# Patient Record
Sex: FEMALE | Race: WHITE
[De-identification: names, ages, dates, MRNs, and addresses within clinical notes are randomized per-mention and may not be internally consistent; named-entity substitution may affect disease eponyms.]

---

## 2017-12-16 ENCOUNTER — HOSPITAL ENCOUNTER (EMERGENCY)
Dept: HOSPITAL 41 - JD.ED | Age: 37
Discharge: HOME | End: 2017-12-16
Payer: COMMERCIAL
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
## 2017-12-16 DIAGNOSIS — S01.112A: Primary | ICD-10-CM

## 2017-12-16 NOTE — EDM.PDOC
ED HPI GENERAL MEDICAL PROBLEM





- General


Chief Complaint: Laceration


Stated Complaint: laceration to face


Time Seen by Provider: 12/16/17 04:45


Source of Information: Reports: Patient, Family


History Limitations: Reports: No Limitations





- History of Present Illness


INITIAL COMMENTS - FREE TEXT/NARRATIVE: 





This is a 37-year-old  female. She comes to the ER with a large 

laceration following her left eyebrow and is 5 cm in length. The laceration 

goes down to the bone and is gapping. The patient states she is not certain 

what happened but she has been drinking alcohol this evening. One of her 

friends states that she fell down some steps but there was no loss of 

consciousness. She complains of soreness in her hands but denies any other 

injuries. She denies any neck pain back pain chest pain abdominal pain or lower 

extremity pain. She states she is up-to-date with her tetanus.


  ** Left Eye


Pain Score (Numeric/FACES): 4





- Related Data


 Allergies











Allergy/AdvReac Type Severity Reaction Status Date / Time


 


No Known Allergies Allergy   Verified 12/16/17 04:44











Home Meds: 


 Home Meds





. [No Known Home Meds]  12/16/17 [History]











Past Medical History





- Past Health History


Medical/Surgical History: Denies Medical/Surgical History


Respiratory History: Reports: None


Gastrointestinal History: Reports: None


Genitourinary History: Reports: None


OB/GYN History: Reports: None


Musculoskeletal History: Reports: None


Psychiatric History: Reports: None


Endocrine/Metabolic History: Reports: None


Dermatologic History: Reports: None





- Past Surgical History


Female  Surgical History: Reports: None


Musculoskeletal Surgical History: Reports: None





Social & Family History





- Family History


Family Medical History: Noncontributory





- Tobacco Use


Smoking Status *Q: Unknown Ever Smoked


Second Hand Smoke Exposure: No





- Alcohol Use


Days Per Week of Alcohol Use: 0





- Recreational Drug Use


Recreational Drug Use: No





ED ROS GENERAL





- Review of Systems


Review Of Systems: See Below


Constitutional: Denies: Fever, Chills


HEENT: Reports: Other (As per history of present illness)


Respiratory: Reports: No Symptoms


Cardiovascular: Reports: No Symptoms


Endocrine: Reports: No Symptoms


GI/Abdominal: Reports: No Symptoms


: Reports: No Symptoms


Musculoskeletal: Reports: Other (As per history of present illness)


Skin: Reports: Other (As per history of present illness)


Neurological: Reports: No Symptoms, Other


Psychiatric: Reports: No Symptoms


Hematologic/Lymphatic: Reports: No Symptoms





ED EXAM, SKIN/RASH


Exam: See Below


Exam Limited By: No Limitations


General Appearance: Alert, WD/WN, Mild Distress


Eye Exam: Bilateral Eye: EOMI, Normal Inspection


Ears: Normal External Exam


Nose: Normal Inspection


Throat/Mouth: Normal Inspection, Normal Lips, Normal Voice


Head: Other (She is noted to have a 5 cm laceration following the left eyebrow 

down to the skull but not through the galea it is gapping open)


Neck: Supple, Other (She denies any neck tenderness)


Respiratory/Chest: No Respiratory Distress, Chest Non-Tender


Back Exam: Full Range of Motion, Other (She denies any thoracic or lumbar 

tenderness)


Extremities: Normal Inspection, Normal Range of Motion, Other (Examination of 

both her wrist and her hands do not reveal any bruising or swelling she has 

full range of motion of her wrist so they're sore for range of motion of her 

hands and she is able to make a fist and extend completely and sore but not 

painful, there is no bone tenderness on palpation of her wrist or her hands)


Neurological: Alert, Oriented, No Motor/Sensory Deficits


Psychiatric: Anxious, Tearful


Skin: Warm, Dry





ED SKIN PROCEDURES





- Laceration/Wound Repair


  ** Left Forehead


Lac/Wound length In cm: 5


Appearance: Subcutaneous, Linear, Clean


Distal NVT: Neuro & Vascular Intact


Anesthetic Type: Local


Local Anesthesia - Lidocaine (Xylocaine): 1% Plain


Local Anesthetic Volume: Other (8 cc)


Skin Prep: Providone-Iodine (Betadine), Saline, Sterile Drape


Exploration/Debridement/Repair: Wound Explored


Closed with: Sutures


Suture Size: other (6-0)


# of Sutures: 10


Suture Type: Nylon, Running


Suture Size: other (5-0)


# of Sutures: 6


Repaired with: Vicryl


Drain Placement: No


Sterile Dressing Applied: Nurse


Tetanus Status Addressed: Yes


Complications: No


Progress/Comments: 





Once the subcutaneous sutures were in it pulled the wound together very nicely 

and brought the forehead tissue down against the skull or galea so there was no 

gapping space and then the skin sutures were placed. The patient tolerated the 

procedure well





Course





- Vital Signs


Last Recorded V/S: 





 Last Vital Signs











Temp  98.5 F   12/16/17 04:42


 


Pulse  88   12/16/17 04:42


 


Resp  18   12/16/17 04:42


 


BP  135/88   12/16/17 04:42


 


Pulse Ox  100   12/16/17 04:42














- Orders/Labs/Meds


Meds: 





Medications














Discontinued Medications














Generic Name Dose Route Start Last Admin





  Trade Name Drew  PRN Reason Stop Dose Admin


 


Lidocaine HCl  50 ml  12/16/17 04:48  12/16/17 04:51





  Xylocaine 1%  INJECT  12/16/17 04:49  50 ml





  NOW STA   Administration














Departure





- Departure


Time of Disposition: 05:33


Disposition: Home, Self-Care 01


Condition: Good


Clinical Impression: 


Laceration of left eyebrow without complication


Qualifiers:


 Encounter type: initial encounter Qualified Code(s): S01.112A - Laceration 

without foreign body of left eyelid and periocular area, initial encounter








- Discharge Information


Instructions:  Laceration Care, Adult, Easy-to-Read


Referrals: 


PCP,None [Primary Care Provider] - 


Additional Instructions: 


Keep your wound clean and dry and covered for at least 3 days, when you change 

the dressing use water to cleanse the wound carefully, take Tylenol or 

ibuprofen or Aleve as needed for the soreness or if you have a headache, you 

need to follow up with the ER, your family physician, or the walk-in clinic in 5

-7 days for suture removal, watch for signs of infection such as increased 

redness increased swelling or yellow purulent drainage and then follow-up the 

ER immediately if this occurs, otherwise follow-up the ER as needed

## 2018-02-18 ENCOUNTER — HOSPITAL ENCOUNTER (EMERGENCY)
Dept: HOSPITAL 41 - JD.ED | Age: 38
Discharge: HOME | End: 2018-02-18
Payer: COMMERCIAL

## 2018-02-18 DIAGNOSIS — S82.831A: Primary | ICD-10-CM
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
## 2018-02-18 DIAGNOSIS — F17.210: ICD-10-CM

## 2018-02-18 NOTE — EDM.PDOC
ED HPI GENERAL MEDICAL PROBLEM





- General


Chief Complaint: Lower Extremity Injury/Pain


Stated Complaint: RT ANKLE INJURY


Time Seen by Provider: 02/18/18 12:17


Source of Information: Reports: Patient


History Limitations: Reports: No Limitations





- History of Present Illness


INITIAL COMMENTS - FREE TEXT/NARRATIVE: 





The patient presents with right ankle pain.  She took her dog out last night 

and she may have stepped wrong or something and she inverted her ankle and 

fell.  She did not hit her head or hurt her neck.  She has no other complaints.

  She has right ankle pain with edema and ecchymosis.


Onset: Sudden


Duration: Day(s): (Last night)


Location: Reports: Lower Extremity, Right (ankle)


Quality: Reports: Sharp


Severity: Moderate


Improves with: Reports: Immobilization


Worsens with: Reports: Movement


Context: Reports: Trauma (She inverted her ankle and fell last night)


Associated Symptoms: Reports: No Other Symptoms


  ** Right Ankle


Pain Score (Numeric/FACES): 10





- Related Data


 Allergies











Allergy/AdvReac Type Severity Reaction Status Date / Time


 


No Known Allergies Allergy   Verified 12/16/17 04:44











Home Meds: 


 Home Meds





. [No Known Home Meds]  12/16/17 [History]











Past Medical History





- Past Health History


Medical/Surgical History: Denies Medical/Surgical History


Respiratory History: Reports: None


Gastrointestinal History: Reports: None


Genitourinary History: Reports: None


OB/GYN History: Reports: None


Musculoskeletal History: Reports: None


Psychiatric History: Reports: None


Endocrine/Metabolic History: Reports: None


Dermatologic History: Reports: None





- Past Surgical History


Female  Surgical History: Reports: None


Musculoskeletal Surgical History: Reports: None





Social & Family History





- Family History


Family Medical History: Noncontributory





- Tobacco Use


Smoking Status *Q: Current Every Day Smoker


Years of Tobacco use: 14


Packs/Tins Daily: 0.5


Second Hand Smoke Exposure: No





- Caffeine Use


Caffeine Use: Reports: Coffee, Tea





- Alcohol Use


Days Per Week of Alcohol Use: 0





- Recreational Drug Use


Recreational Drug Use: No





Review of Systems





- Review of Systems


Review Of Systems: See Below


Constitutional: Reports: No Symptoms


Eyes: Reports: No Symptoms


Ears: Reports: No Symptoms


Nose: Reports: No Symptoms


Mouth/Throat: Reports: No Symptoms


Respiratory: Reports: No Symptoms


Cardiovascular: Reports: No Symptoms


GI/Abdominal: Reports: No Symptoms


Genitourinary: Reports: No Symptoms


Musculoskeletal: Reports: Other (Right ankle pain, edema and ecchymosis)





ED EXAM, GENERAL





- Physical Exam


Exam: See Below


Exam Limited By: No Limitations


General Appearance: Alert, No Apparent Distress


Ears: Normal External Exam


Nose: Normal Inspection


Head: Atraumatic, Normocephalic


Neck: Normal Inspection


Respiratory/Chest: No Respiratory Distress


Extremities: Other (Edema, ecchymosis and pain upon palpation to the lateral 

malleolus.  Good sensation and pulses distally.  Pain upon palpation to the 

fibular head.)





Course





- Orders/Labs/Meds


Orders: 


 Active Orders 24 hr











 Category Date Time Status


 


 Ankle Min 3V Rt [CR] Stat Exams  02/18/18 12:35 Taken


 


 Tibia Fibula Rt [CR] Stat Exams  02/18/18 12:35 Taken














- Re-Assessments/Exams


Free Text/Narrative Re-Assessment/Exam: 





02/18/18 12:43


I have ordered an x-ray of her ankle and tib fib.


02/18/18 13:28


The patient has a distal fibular fracture.  I will get her in a walking boot 

and crutches and have her follow up with Dr Marks.





Departure





- Departure


Time of Disposition: 13:35


Disposition: Home, Self-Care 01


Condition: Good


Clinical Impression: 


Fracture of distal fibula


Qualifiers:


 Encounter type: initial encounter Fracture type: closed Fracture morphology: 

unspecified fracture morphology Laterality: right Qualified Code(s): S82.831A - 

Other fracture of upper and lower end of right fibula, initial encounter for 

closed fracture








- Discharge Information


Referrals: 


PCP,None [Primary Care Provider] - 


Jose M Marks DO [Physician] - 1 Week


Forms:  ED Department Discharge


Additional Instructions: 


Ice your ankle for 15 minutes every other hour while awake for 2 days.  Elevate 

your ankle as much as you can for 2 days.  Take tylenol or motrin for pain.  

Wear the walking boot for support and do not put any weight on your leg for 

now.  Follow up with Dr Marks within 1 week.  Please return if you are worse.





- My Orders


Last 24 Hours: 


My Active Orders





02/18/18 12:35


Ankle Min 3V Rt [CR] Stat 


Tibia Fibula Rt [CR] Stat 














- Assessment/Plan


Last 24 Hours: 


My Active Orders





02/18/18 12:35


Ankle Min 3V Rt [CR] Stat 


Tibia Fibula Rt [CR] Stat

## 2018-02-19 NOTE — CR
Right ankle: Four views of the right ankle were obtained.

 

Minimally comminuted distal fibular fracture is seen through the 

lateral malleolus.  Minimal displacement is seen up to 1.5 mm.  No 

additional fracture is seen.  Soft tissue swelling is identified.

 

Impression:

1.  Minimally displaced lateral malleolar fracture with soft tissue 

swelling.

 

Diagnostic code #3

## 2018-02-19 NOTE — CR
Right tibia and fibula:  Two views of the right tibia and fibula were 

obtained.

 

Distal fibular fracture is again seen with soft tissue swelling at the

 ankle.  No proximal abnormality is seen.

 

Impression:

1.  Distal fibular fracture with soft tissue swelling.

2.  Right tibia and fibula study are otherwise unremarkable.

 

Diagnostic code #3

## 2018-04-26 ENCOUNTER — HOSPITAL ENCOUNTER (EMERGENCY)
Dept: HOSPITAL 41 - JD.ED | Age: 38
Discharge: HOME | End: 2018-04-26
Payer: COMMERCIAL

## 2018-04-26 DIAGNOSIS — M54.2: Primary | ICD-10-CM

## 2018-04-26 DIAGNOSIS — F17.210: ICD-10-CM

## 2018-04-26 NOTE — EDM.PDOC
ED HPI GENERAL MEDICAL PROBLEM





- General


Chief Complaint: Upper Extremity Injury/Pain


Stated Complaint: LT SHOULDER PAIN


Time Seen by Provider: 04/26/18 07:12


Source of Information: Reports: Patient


History Limitations: Reports: No Limitations





- History of Present Illness


INITIAL COMMENTS - FREE TEXT/NARRATIVE: 





37-year-old female presents to the ED with diffuse cervical neck pain radiating 

into across her left posterior superior shoulder and into her upper arm. She 

relates the pain is gradually become more intense and constant over the last 10-

12 days. She recognizes no acute injuries to her neck. But she relates that she 

did fall face first with resultant laceration above her left eyebrow requiring 

suture repair about 3 months ago. Apparently she did not have any cervical pain 

at that time. She's been to the chiropractor massage therapist with minimal 

improvement. She's been taking Aleve primarily for pain relief into high-dose 

like 4 to time. No similar previous prongs. No known injuries to her shoulder. 

Pain is constant. She cannot lie on that side to sleep. She feels better if she 

puts her chin on her chest it relieves the pain. Pain is worsened by 

hyperextending her cervical spine.


Onset: Gradual


Onset Date: 04/13/18


Duration: Day(s): (On or about that date.)


Location: Reports: Neck (Radiating to her left upper extremity.), Radiates to (

Left upper extremity primarily the shoulder and arm.)


Quality: Reports: Ache, Throbbing, Other


Severity: Severe (Burning lancinating pain with certain movements.)


Improves with: Reports: Other (She's better for has less pain and she has chin 

on chest position.)


Worsens with: Reports: Movement (Particularily hyper extension of the neck.)


Context: Denies: Activity, Exercise, Lifting, Sick Contact, Trauma, Other


Associated Symptoms: Reports: No Other Symptoms, Other (Markedly disrupted 

sleep due to pain.).  Denies: Diaphoresis, Fever/Chills, Headaches, Loss of 

Appetite, Nausea/Vomiting, Rash, Seizure, Shortness of Breath


Treatments PTA: Reports: NSAIDS


  ** Left Shoulder


Pain Score (Numeric/FACES): 8





- Related Data


 Allergies











Allergy/AdvReac Type Severity Reaction Status Date / Time


 


No Known Allergies Allergy   Verified 04/26/18 07:10











Home Meds: 


 Home Meds





Diclofenac Sodium [Voltaren] 50 mg PO TID #36 tab.ec 04/26/18 [Rx]


oxyCODONE HCl/Acetaminophen [Percocet 5-325 mg Tablet] 1 - 2 each PO Q4H PRN #

30 tablet 04/26/18 [Rx]


predniSONE [Deltasone] 20 mg PO ASDIRECTED #18 tablet 04/26/18 [Rx]











Past Medical History





- Past Health History


Medical/Surgical History: Denies Medical/Surgical History


Respiratory History: Reports: None


Gastrointestinal History: Reports: None


Genitourinary History: Reports: None


OB/GYN History: Reports: None


Musculoskeletal History: Reports: None


Psychiatric History: Reports: None


Endocrine/Metabolic History: Reports: None


Dermatologic History: Reports: None





- Past Surgical History


Female  Surgical History: Reports: None


Musculoskeletal Surgical History: Reports: None





Social & Family History





- Family History


Family Medical History: Noncontributory





- Tobacco Use


Smoking Status *Q: Current Every Day Smoker


Years of Tobacco use: 19


Packs/Tins Daily: 0.5


Used Tobacco, but Quit: No


Second Hand Smoke Exposure: No





- Caffeine Use


Caffeine Use: Reports: Coffee





- Alcohol Use


Days Per Week of Alcohol Use: 0





- Recreational Drug Use


Recreational Drug Use: No





- Living Situation & Occupation


Living situation: Reports: Single


Occupation: Employed





Review of Systems





- Review of Systems


Review Of Systems: See Below


Constitutional: Reports: No Symptoms


Eyes: Reports: No Symptoms


Ears: Reports: No Symptoms


Nose: Reports: No Symptoms


Mouth/Throat: Reports: No Symptoms


Respiratory: Reports: No Symptoms


Cardiovascular: Reports: No Symptoms


GI/Abdominal: Reports: No Symptoms


Genitourinary: Reports: No Symptoms


Musculoskeletal: Reports: Neck Pain, Shoulder Pain


Skin: Reports: No Symptoms (See history of present illness)


Neurological: Reports: No Symptoms


Psychiatric: Reports: No Symptoms





ED EXAM, GENERAL





- Physical Exam


Exam: See Below


Exam Limited By: No Limitations


General Appearance: Alert, WD/WN, Moderate Distress


Eye Exam: Bilateral Eye: Normal Inspection (Appears to be in genuine pain.)


Head: Atraumatic, Normocephalic


Neck: Normal Inspection, Tender Lateral (Left side particularly C5-C6 facet 

joint area.), Other (Axial traction to the neck increased radicular pain into 

the left upper extremity.).  No: Lymphadenopathy (L), Lymphadenopathy (R)


Respiratory/Chest: No Respiratory Distress, Lungs Clear, Normal Breath Sounds, 

No Accessory Muscle Use


Cardiovascular: Normal Peripheral Pulses, Regular Rate, Rhythm, No Edema, No 

Gallop, No Murmur


Extremities: Normal Inspection, Normal Range of Motion, Non-Tender, No Pedal 

Edema, Other (No evidence of rotator cuff injury no evidence of biceps 

tendinitis or deltoid tendinitis. There is pain across the superior aspect of 

the left shoulder in the distribution of the superior belly of the trapezius 

and levator scapula muscle distribution. There is spasm of this muscle.)


Neurological: Alert, Oriented, CN II-XII Intact, Normal Cognition, Normal Gait, 

Normal Reflexes, No Motor/Sensory Deficits


Psychiatric: Normal Affect, Normal Mood


Skin Exam: Warm, Dry, Intact, Normal Color, No Rash





Course





- Vital Signs


Last Recorded V/S: 


 Last Vital Signs











Temp  36.6 C   04/26/18 07:07


 


Pulse  69   04/26/18 07:07


 


Resp  18   04/26/18 07:07


 


BP  122/81   04/26/18 07:07


 


Pulse Ox  100   04/26/18 07:07














- Radiology Interpretation


Free Text/Narrative:: 


37-year-old female presents to the ED with gradually worsening cervical neck 

pain rating to the left upper shoulder and upper arm. She estimates it's been 

getting gradually worsening over a period of 10-14 days. [place] injury 

occurred. She works as a  and is on all different kinds of positions. 

Fall 3 months ago with a resultant laceration above her left eyebrow that 

required laceration repair. Therefore it suggest there probably was a hyper 

extension injury to her cervical spine at that time. However she didn't develop 

any symptoms until about 10-12 days ago. She's been to the chiropractor and 

massage therapist with limited improvement in the pain. Pain is constant and 

only flexing her chin on her chest gives her some relief of pain. Examination 

suggests that she has a cervical disc herniation likely at C5-6 C7. Plan will 

be to have arrange an MRI as an outpatient and have her follow-up in the clinic 

for physiotherapy arrangements and/or neurosurgical consultation. In the 

meantime I will place her on Voltaren 50 mg 3 times daily for the next 12 days. 

Prednisone 20 mg a.m. and p.m. for 6 days and then once in the morning only for 

another 6 days. Percocet tabs 5/3/25 one or 2 every 4-6 hours needed for pain 

relief primarily at bedtime so that she can sleep. 30 tablets were provided.








Departure





- Departure


Time of Disposition: 07:25


Disposition: Home, Self-Care 01


Condition: Fair


Clinical Impression: 


 Cervical neck pain with evidence of disc disease








- Discharge Information


Prescriptions: 


Diclofenac Sodium [Voltaren] 50 mg PO TID #36 tab.ec


oxyCODONE HCl/Acetaminophen [Percocet 5-325 mg Tablet] 1 - 2 each PO Q4H PRN #

30 tablet


 PRN Reason: pain relief. 


predniSONE [Deltasone] 20 mg PO ASDIRECTED #18 tablet


Referrals: 


PCP,None [Primary Care Provider] - 


Forms:  ED Department Discharge


Additional Instructions: 


Evaluation in the emergency room today in regards to pain starting in her neck 

and radiating into the left shoulder and upper arm. This started over 10 days 

ago and is gradually worsening. Clinical examination suggests likely disc 

herniation or bulge at the C5-C6 level. Clinically there was no evidence of any 

rotator cuff tears within the shoulder itself. Suggest treatment to be anti-

inflammatory Voltaren 50 mg 3 times daily for the next 12 days. Deltasone 20 mg 

with breakfast and supper for 6 days then 1 tab in the morning only for another 

6 days to further reduce pain and inflammation from the nerve being compressed. 

Pain medication is Percocet 5/3/25 milligrams strength one or 2 every 4-6 hours 

as necessary for pain relief. MRI of her cervical spine needs to be done. I 

will have the x-ray department call you later this morning with an appointment 

time that is mutually agreeable to both of you. You will need follow-up with a 

local doctor to further facilitate either physiotherapy program or referral to 

neurosurgery. Suggest make an appointment to see Dr. Bond on the other side of 

the Hospital second floor. Please call [phone] to arrange an appointment. 

Results of your MRI will be sent to her office. The results are usually 

available a day after your MRI is been completed.

## 2019-04-09 ENCOUNTER — HOSPITAL ENCOUNTER (INPATIENT)
Dept: HOSPITAL 41 - JD.OB | Age: 39
LOS: 2 days | Discharge: HOME | End: 2019-04-11
Attending: OBSTETRICS & GYNECOLOGY | Admitting: OBSTETRICS & GYNECOLOGY
Payer: COMMERCIAL

## 2019-04-09 DIAGNOSIS — Z88.0: ICD-10-CM

## 2019-04-09 DIAGNOSIS — O36.8130: Primary | ICD-10-CM

## 2019-04-09 DIAGNOSIS — Z3A.39: ICD-10-CM

## 2019-04-09 DIAGNOSIS — F17.210: ICD-10-CM

## 2019-04-09 NOTE — PCM.PREANE
Preanesthetic Assessment





- Anesthesia/Transfusion/Family Hx


Anesthesia History: Prior Anesthesia Without Reaction


Family History of Anesthesia Reaction: No


Transfusion History: No Prior Transfusion(s)





- Review of Systems


General: No Symptoms


Pulmonary: No Symptoms


Cardiovascular: No Symptoms


Gastrointestinal: Other (Heart Burn )


Neurological: No Symptoms


Other: Reports: None





- Physical Assessment


O2 Sat by Pulse Oximetry: 98


Respiratory Rate: 18


Vital Signs: 





 Last Vital Signs











Temp  36.4 C   04/09/19 13:39


 


Pulse  101 H  04/09/19 13:39


 


Resp  18   04/09/19 13:39


 


BP  127/94 H  04/09/19 13:39


 


Pulse Ox      











Height: 1.68 m


Weight: 94.347 kg


ASA Class: 2


Mental Status: Alert & Oriented x3


Airway Class: Mallampati = 1


Dentition: Reports: Normal Dentition


Thyro-Mental Finger Breadths: 3


Mouth Opening Finger Breadths: 3


ROM/Head Extension: Full


Lungs: Clear to Auscultation, Normal Respiratory Effort


Cardiovascular: Regular Rate, Regular Rhythm





- Lab


Values: 





 Laboratory Last Values











WBC  13.50 K/mm3 (3.98-10.04)  H  04/09/19  14:25    


 


RBC  4.22 M/mm3 (3.98-5.22)   04/09/19  14:25    


 


Hgb  13.7 gm/L (11.2-15.7)   04/09/19  14:25    


 


Hct  39.7 % (34.1-44.9)   04/09/19  14:25    


 


MCV  94.1 fl (79.4-94.8)   04/09/19  14:25    


 


MCH  32.5 pg (25.6-32.2)  H  04/09/19  14:25    


 


MCHC  34.5 g/dl (32.2-35.5)   04/09/19  14:25    


 


RDW Std Deviation  45.4 fL (36.4-46.3)   04/09/19  14:25    


 


Plt Count  167 K/mm3 (182-369)  L  04/09/19  14:25    


 


MPV  11.3 fl (9.4-12.3)   04/09/19  14:25    


 


Neut % (Auto)  67.4 % (34.0-71.1)   04/09/19  14:25    


 


Lymph % (Auto)  24.4 % (19.3-51.7)   04/09/19  14:25    


 


Mono % (Auto)  7.1 % (4.7-12.5)   04/09/19  14:25    


 


Eos % (Auto)  0.4  (0.7-5.8)  L  04/09/19  14:25    


 


Baso % (Auto)  0.1 % (0.1-1.2)   04/09/19  14:25    


 


Neut # (Auto)  9.09 K/mm3 (1.56-6.13)  H  04/09/19  14:25    


 


Lymph # (Auto)  3.29 K/mm3 (1.18-3.74)   04/09/19  14:25    


 


Mono # (Auto)  0.96 K/mm3 (0.24-0.36)  H  04/09/19  14:25    


 


Eos # (Auto)  0.06 K/mm3 (0.04-0.36)   04/09/19  14:25    


 


Baso # (Auto)  0.02 K/mm3 (0.01-0.08)   04/09/19  14:25    


 


RPR  Non-reactive  (NONREACTIVE)   04/09/19  14:25    














- Allergies


Allergies/Adverse Reactions: 


 Allergies











Allergy/AdvReac Type Severity Reaction Status Date / Time


 


No Known Allergies Allergy   Verified 04/26/18 07:10














- Acknowledgements


Anesthesia Type Planned: Epidural


Pt an Appropriate Candidate for the Planned Anesthesia: Yes


Alternatives and Risks of Anesthesia Discussed w Pt/Guardian: Yes


Pt/Guardian Understands and Agrees with Anesthesia Plan: Yes





PreAnesthesia Questionnaire





- Past Health History


Medical/Surgical History: Denies Medical/Surgical History


HEENT History: Reports: Impaired Vision


Other HEENT History: wears corrective lenses


Respiratory History: Reports: None


Gastrointestinal History: Reports: None


Genitourinary History: Reports: None


OB/GYN History: Reports: Pregnancy


Musculoskeletal History: Reports: None


Other Musculoskeletal History: ankle and wrsit fractures


Psychiatric History: Reports: None


Endocrine/Metabolic History: Reports: None


Dermatologic History: Reports: None





- Past Surgical History


Female  Surgical History: Reports: None


Musculoskeletal Surgical History: Reports: None





- SUBSTANCE USE


Smoking Status *Q: Current Some Day Smoker


Tobacco Use Within Last Twelve Months: Cigarettes


Recreational Drug Use History: No





- HOME MEDS


Home Medications: 


 Home Meds





PNV95/Ferrous Fumarate/FA [Prenatal Tablet] 1 tab PO DAILY 04/09/19 [History]











- CURRENT (IN HOUSE) MEDS


Current Meds: 





 Current Medications





Diphenhydramine HCl (Benadryl)  25 mg IVPUSH Q6H PRN


   PRN Reason: Pruritis


Ephedrine Sulfate (Ephedrine Sulfate)  5 mg IVPUSH ASDIRECTED PRN


   PRN Reason: Hypotension


Fentanyl (Sublimaze)  100 mcg EPIDUR ONETIME PRN


   PRN Reason: Pain


   Last Admin: 04/09/19 21:34 Dose:  100 mcg


Ampicillin Sodium 1 gm/ Sodium (Chloride)  100 mls @ 200 mls/hr IV Q4H STONEY


   Last Admin: 04/09/19 18:23 Dose:  200 mls/hr


Lactated Ringer's (Ringers, Lactated)  1,000 mls @ 100 mls/hr IV ASDIRECTED STONEY


   Last Admin: 04/09/19 19:57 Dose:  100 mls/hr


Oxytocin/Lactated Ringer's (Pitocin In Lr 10 Units/1,000 Ml)  10 unit in 1,000 

mls @ 12 mls/hr IV TITRATE STONEY; Protocol


   Last Titration: 04/09/19 19:54 Dose:  14 munits/min, 84 mls/hr


Oxytocin/Lactated Ringer's (Pitocin In Lr 10 Units/1,000 Ml)  10 unit in 1,000 

mls @ 500 mls/hr IV .CONTINUOUS STONEY


Nalbuphine HCl (Nubain)  10 mg IVPUSH Q2H PRN


   PRN Reason: pain


Ondansetron HCl (Zofran)  4 mg IVPUSH Q4H PRN


   PRN Reason: Nausea/Vomiting


Ondansetron HCl (Zofran)  4 mg IVPUSH ONETIME PRN


   PRN Reason: Nausea/Vomiting


Sodium Chloride (Saline Flush)  10 ml FLUSH ASDIRECTED PRN


   PRN Reason: Keep Vein Open





Discontinued Medications





Fentanyl/Bupivacaine HCl (Fentanyl-Bupiv-Ns 2 Mcg/Ml-0.125%)  100 ml EPIDUR 

ONETIME ONE


   Stop: 04/09/19 20:53


   Last Admin: 04/09/19 21:34 Dose:  100 ml


Ampicillin Sodium 2 gm/ Sodium (Chloride)  100 mls @ 200 mls/hr IV ONETIME ONE


   Stop: 04/09/19 14:29


   Last Admin: 04/09/19 14:35 Dose:  200 mls/hr

## 2019-04-10 PROCEDURE — 3E0R3BZ INTRODUCTION OF ANESTHETIC AGENT INTO SPINAL CANAL, PERCUTANEOUS APPROACH: ICD-10-PCS

## 2019-04-10 PROCEDURE — 00HU33Z INSERTION OF INFUSION DEVICE INTO SPINAL CANAL, PERCUTANEOUS APPROACH: ICD-10-PCS

## 2019-04-10 PROCEDURE — 6A550ZT PHERESIS OF CORD BLOOD STEM CELLS, SINGLE: ICD-10-PCS | Performed by: OBSTETRICS & GYNECOLOGY

## 2019-04-10 PROCEDURE — 3E033VJ INTRODUCTION OF OTHER HORMONE INTO PERIPHERAL VEIN, PERCUTANEOUS APPROACH: ICD-10-PCS | Performed by: OBSTETRICS & GYNECOLOGY

## 2019-04-10 PROCEDURE — 0UQGXZZ REPAIR VAGINA, EXTERNAL APPROACH: ICD-10-PCS | Performed by: OBSTETRICS & GYNECOLOGY

## 2019-04-10 PROCEDURE — 10907ZC DRAINAGE OF AMNIOTIC FLUID, THERAPEUTIC FROM PRODUCTS OF CONCEPTION, VIA NATURAL OR ARTIFICIAL OPENING: ICD-10-PCS | Performed by: OBSTETRICS & GYNECOLOGY

## 2019-04-10 NOTE — PCM.LDHP
L&D History of Present Illness





- General


Date of Service: 04/10/19


Admit Problem/Dx: 


 Patient Status Order with Admit Dx/Problem





19 13:39


Patient Status [ADT] Routine 








 Admission Diagnosis/Problem











Admission Diagnosis/Problem    Pregnancy














04/10/19 19:39


Rosalba is a 38-year-old  2 para 0010 white female was admitted for 

induction of labor for reported decreased fetal movement at 39-6/7 weeks with 

an TIESHA of 4/10/2019.


Source of Information: Patient


History Limitations: Reports: No Limitations





- History of Present Illness


Introduction:: 





Rosalba is a 38-year-old  2 para 0010 white female at 39-6/7 weeks with 

an TIESHA of 4/10/2019 is admitted at midday on 2019 for reported decreased 

fetal activity.


The patient had a reactive NST patient is not feeling the baby move adequately 

and decision was made to proceed with induction of labor for this reason. 





OB/GYN history  2 para 0010. [place]  is based on early ultrasound 

done on 920 17-4/7 weeks gestational age. LMP was 2018. Patient had 2 other 

ultrasounds on 2018 and 2018 both supported and TIESHA of 4/10/2019. 

Prenatal course was relatively unremarkable. Her first prenatal visit occurred 

on 2018. She had been seen on a regular basis. She is a centering 

pregnancy patient. Fundal height growth was appropriate area weight gain was 

from 178-207.6 pounds or approximately 29.6 pound weight gain.





Previous obstetric history includes miscarriage at 12 weeks and 2008. 

Cook testing was negative. Her [place] postpartum depression screening 

score was 5 on a scale of 30 on 11/15/2018. She plans to breast-feed.





Prenatal laboratory testing shows blood to be B+. Initial first prenatal labs 

show hemoglobin of 13. platelets 280,000. She is rubella immune. RPR is 

nonreactive. Urine culture was negative. H[employer] B surface antigen and HIV 

assays were both negative. Chlamydia and gonorrhea assays were both negative. 

Second trimester laboratory testing showed a hemoglobin 11.9 g/dL. Her 

platelets are 270,000. One-hour GTT was normal at 115 mg/dL. Her group B strep 

screen was positive. Patient allergic to penicillin and therefore it is a 

candidate for penicillin prophylaxis for group B strep while in labor and 

delivery.





Allergies: None





Medications:





1. Ferrous sulfate 325 mg by mouth daily





2. Prenatal vitamins daily





3. Cleocin T external solution 1%twice a day for acne





Past medical history:





1. Miscarriage 1





Past surgical history:





1. LEEP done 2012cervical dysplasia





2. [place] teeth extraction.





Family history mother is alive and well. Father is alive with hypertension. 

Maternal grandfather is deceasedcause unknown. Maternal grandmother 

deceasedcause unknown. General grandfather is deceasedunknown cause. Paternal 

grandmother is  due to internal bleeding, possible cancer. No family 

history of cancer otherwise. No bleeding, blood clotting, anesthesia or 

pregnancy-related problems noted with the exception of a cousin with cervical 

cancer.





Social history. Patient's currently smokes 1/2-1/3 pack cigarettes per day. She 

does not use any significant loss of alcohol or drugs. She lives in [facility]. She is single. Her significant other is Cornelius.





Review of systems: In general patient has no complaints. Patient's pregnancy 

symptoms. Baby has been active.





Skin: Negative





Lungs: No infectious symptoms or shortness of breath





Cardiovascular: No chest pain or exercise intolerance





Breasts: No lumps, changes in size, pain, dimpling, discharge or axillary or 

supraclavicular concerns.





GI: Negative





: Negative





Musculoskeletal: Negative





Neurological: Negative





In general the patient is well-developed, well-nourished, pleasant female of 

stated age in no acute distress.





Skin is warm dry without lesions.





HEENT, neck and back within normal limits.





Lungs are clear with good breath sounds in all lung fields.





Breast exam deferred having been done at time of first prenatal visit and found 

to be normal. Patient plans to breast-feed.





Cardiovascular exam shows regular and rhythm without murmurs.





Abdomen is flat, soft, nontender without masses or organomegaly. Positive bowel 

sounds are noted. No inguinal lymphadenopathy or hernias are noted.





Genital exam shows cervix to be 3 cm/90% effaced/anterior position/-1 station/

soft.





Extremities and neurological exam are grossly within normal limits.  


Pain Score: 10





- Related Data


Allergies/Adverse Reactions: 


 Allergies











Allergy/AdvReac Type Severity Reaction Status Date / Time


 


No Known Allergies Allergy   Verified 18 07:10











Home Medications: 


 Home Meds





PNV95/Ferrous Fumarate/FA [Prenatal Tablet] 1 tab PO DAILY 19 [History]











Past Medical History





- Past Health History


Medical/Surgical History: Denies Medical/Surgical History


HEENT History: Reports: Impaired Vision


Other HEENT History: wears corrective lenses


Respiratory History: Reports: None


Gastrointestinal History: Reports: None


Genitourinary History: Reports: None


OB/GYN History: Reports: Pregnancy


Musculoskeletal History: Reports: None


Other Musculoskeletal History: ankle and wrsit fractures


Psychiatric History: Reports: None


Endocrine/Metabolic History: Reports: None


Dermatologic History: Reports: None





- Past Surgical History


Female  Surgical History: Reports: None


Musculoskeletal Surgical History: Reports: None





Social & Family History





- Family History


Family Medical History: Noncontributory





- Tobacco Use


Smoking Status *Q: Current Some Day Smoker


Years of Tobacco use: 20


Packs/Tins Daily: 0.2


Used Tobacco, but Quit: Yes


Month/Year Tobacco Last Used: april





- Caffeine Use


Caffeine Use: Reports: Coffee, Soda





- Recreational Drug Use


Recreational Drug Use: No





- Living Situation & Occupation


Living situation: Reports: Single


Occupation: Employed





H&P Review of Systems





- Review of Systems:


Review Of Systems: See Below





L&D Exam





- Exam


Exam: See Below





- Vital Signs


Vital Signs: 


 Last Vital Signs











Temp  36.4 C   19 13:39


 


Pulse  101 H  19 13:39


 


Resp  18   19 21:36


 


BP  127/94 H  19 13:39


 


Pulse Ox  98   19 21:36











Weight: 94.347 kg





- Patient Data


Lab Results Last 24 hrs: 


 Laboratory Results - last 24 hr











  19 Range/Units





  14:25 14:25 


 


WBC   13.50 H  (3.98-10.04)  K/mm3


 


RBC   4.22  (3.98-5.22)  M/mm3


 


Hgb   13.7  (11.2-15.7)  gm/L


 


Hct   39.7  (34.1-44.9)  %


 


MCV   94.1  (79.4-94.8)  fl


 


MCH   32.5 H  (25.6-32.2)  pg


 


MCHC   34.5  (32.2-35.5)  g/dl


 


RDW Std Deviation   45.4  (36.4-46.3)  fL


 


Plt Count   167 L  (182-369)  K/mm3


 


MPV   11.3  (9.4-12.3)  fl


 


Neut % (Auto)   67.4  (34.0-71.1)  %


 


Lymph % (Auto)   24.4  (19.3-51.7)  %


 


Mono % (Auto)   7.1  (4.7-12.5)  %


 


Eos % (Auto)   0.4 L  (0.7-5.8)  


 


Baso % (Auto)   0.1  (0.1-1.2)  %


 


Neut # (Auto)   9.09 H  (1.56-6.13)  K/mm3


 


Lymph # (Auto)   3.29  (1.18-3.74)  K/mm3


 


Mono # (Auto)   0.96 H  (0.24-0.36)  K/mm3


 


Eos # (Auto)   0.06  (0.04-0.36)  K/mm3


 


Baso # (Auto)   0.02  (0.01-0.08)  K/mm3


 


RPR  Non-reactive   (NONREACTIVE)  











Result Diagrams: 


 19 14:25





Problem List Initiated/Reviewed/Updated: Yes


Orders Last 24hrs: 


 Active Orders 24 hr











 Category Date Time Status


 


 Patient Status Manage Transfer [TRANSFER] Routine ADT  04/10/19 02:57 Ordered


 


 Patient Status [ADT] Routine ADT  19 13:39 Active


 


 Activity as Tolerated [RC] PFP Care  19 13:39 Active


 


 Communication Order [RC] ASDIRECTED Care  19 13:39 Active


 


 Fetal Heart Tones [RC] ASDIRECTED Care  19 13:40 Active


 


 Fetal Non Stress Test [RC] PER UNIT ROUTINE Care  19 13:39 Active


 


 Notify Provider [RC] ASDIRECTED Care  19 20:52 Active


 


 Notify Provider [RC] PFP Care  19 13:39 Active


 


 Notify Provider [RC] PRN Care  19 13:39 Active


 


 Peripheral IV Care [RC] .AS DIRECTED Care  19 13:40 Active


 


 Vital Signs [RC] PER UNIT ROUTINE Care  19 13:39 Active


 


 Regular Diet [DIET] Diet  19 Lunch Active


 


 Ampicillin 1 gm Med  19 18:00 Active





 Sodium Chloride 0.9% [Normal Saline] 100 ml   





 IV Q4H   


 


 Lactated Ringers [Ringers, Lactated] 1,000 ml Med  19 13:45 Active





 IV ASDIRECTED   


 


 Nalbuphine [Nubain] Med  19 13:39 Active





 10 mg IVPUSH Q2H PRN   


 


 Ondansetron [Zofran] Med  19 20:52 Active





 4 mg IVPUSH ONETIME PRN   


 


 Ondansetron [Zofran] Med  19 13:39 Active





 4 mg IVPUSH Q4H PRN   


 


 Oxytocin/Lactated Ringers [Pitocin in LR 10 Units/1,000 Med  19 13:45 

Active





 ML]   





 10 unit in 1,000 ml IV .CONTINUOUS   


 


 Oxytocin/Lactated Ringers [Pitocin in LR 10 Units/1,000 Med  19 13:45 

Active





 ML]   





 10 unit in 1,000 ml IV TITRATE   


 


 Sodium Chloride 0.9% [Saline Flush] Med  19 13:39 Active





 10 ml FLUSH ASDIRECTED PRN   


 


 diphenhydrAMINE [Benadryl] Med  19 20:52 Active





 25 mg IVPUSH Q6H PRN   


 


 ePHEDrine [ePHEDrine sulfate] Med  19 20:52 Active





 5 mg IVPUSH ASDIRECTED PRN   


 


 fentaNYL [Sublimaze] Med  19 20:52 Active





 100 mcg EPIDUR ONETIME PRN   


 


 Electronic Fetal Heart Tones Ext w TOCO [WOMSER] Oth  19 13:39 Ordered





 Routine   


 


 Electronic Fetal Heart Tones Internal [WOMSER] Per Unit Oth  19 13:39 

Ordered





 Routine   


 


 Peripheral IV Insertion Adult [OM.PC] Routine Oth  19 13:39 Ordered


 


 Resuscitation Status Routine Resus Stat  19 13:39 Ordered








 Medication Orders





Diphenhydramine HCl (Benadryl)  25 mg IVPUSH Q6H PRN


   PRN Reason: Pruritis


Ephedrine Sulfate (Ephedrine Sulfate)  5 mg IVPUSH ASDIRECTED PRN


   PRN Reason: Hypotension


Fentanyl (Sublimaze)  100 mcg EPIDUR ONETIME PRN


   PRN Reason: Pain


   Last Admin: 19 21:34  Dose: 100 mcg


Ampicillin Sodium 1 gm/ Sodium (Chloride)  100 mls @ 200 mls/hr IV Q4H STONEY


   Last Admin: 19 21:54  Dose: 200 mls/hr


   Infusion: 19 18:53  Dose: 200 mls/hr


   Admin: 19 18:23  Dose: 200 mls/hr


Lactated Ringer's (Ringers, Lactated)  1,000 mls @ 100 mls/hr IV ASDIRECTED STONEY


   Last Admin: 19 21:55  Dose: 100 mls/hr


   Infusion: 19 21:55  Dose: 100 mls/hr


   Admin: 19 19:57  Dose: 100 mls/hr


   Infusion: 19 19:57  Dose: 100 mls/hr


   Admin: 19 14:30  Dose: 100 mls/hr


Oxytocin/Lactated Ringer's (Pitocin In Lr 10 Units/1,000 Ml)  10 unit in 1,000 

mls @ 12 mls/hr IV TITRATE STONEY; Protocol


   Last Titration: 19 19:54  Dose: 14 munits/min, 84 mls/hr


   Titration: 19 18:34  Dose: 12 munits/min, 72 mls/hr


   Titration: 19 17:00  Dose: 10 munits/min, 60 mls/hr


   Titration: 19 16:35  Dose: 8 munits/min, 48 mls/hr


   Titration: 19 16:00  Dose: 6 munits/min, 36 mls/hr


   Titration: 19 15:25  Dose: 4 munits/min, 24 mls/hr


   Admin: 19 14:35  Dose: 2 munits/min, 12 mls/hr


Oxytocin/Lactated Ringer's (Pitocin In Lr 10 Units/1,000 Ml)  10 unit in 1,000 

mls @ 500 mls/hr IV .CONTINUOUS STONEY


Nalbuphine HCl (Nubain)  10 mg IVPUSH Q2H PRN


   PRN Reason: pain


Ondansetron HCl (Zofran)  4 mg IVPUSH Q4H PRN


   PRN Reason: Nausea/Vomiting


Ondansetron HCl (Zofran)  4 mg IVPUSH ONETIME PRN


   PRN Reason: Nausea/Vomiting


Sodium Chloride (Saline Flush)  10 ml FLUSH ASDIRECTED PRN


   PRN Reason: Keep Vein Open








Assessment/Plan Comment:: 





1. 39-6/7 week intrauterine pregnancy admitted for elective induction of labor 

due to perceived decreased fetal movement. Patient presently not feeling any 

fetal activity despite a reactive NST.





2. Group B strep screen positivepatient is candidate for ampicillin reflexes 

per protocol in labor and delivery





3. Patient plans to breast-feed.





4. Rubella titer shows immunity





5. Generally healthy female with 1 previous miscarriage





6. Patient is up-to-date regarding her T Dap done on 2019 and her flu shot 

on 10/17/2018. She is rubella immune.





7. Epidural for labor analgesia





Plan:





1. Pitocin induction with artificial rupture membranes follow-up. Routine 

monitoring and activity/diet in labor.





2. Group B strep prophylaxis ampicillin per protocol





3. Support breast-feeding decision.





4. Anticipate .

## 2019-04-10 NOTE — PCM.SN
- Free Text/Narrative


Note: 


   Rosalba is a 38-year-old  1 now para 1001 white female was admitted 

at midday on 2019 for decreased fetal movement. He is admitted for 

induction of labor. Pitocin was started initially and patient eventually had 

spontaneous rupture membranes. She progressed well through labor. She had 

epidural for labor and analgesia. She began pushing at approximately there 0015 

hours on 4/10/2019. She delivered a viable, huggins, male infant with Apgars 

of 8 and 8, a weight of 3340 g (7 pounds 5.6 ounces), a length of 22.0 inches 

in an occiput anterior position. She delivered at 0229 hours on 4/10/2019. He 

was placed on mom's abdomen, nose and mouth were bulb suctioned. Pitocin was 

increased to 500 mL/h after delivery the baby to facilitate increase uterine 

tone and decrease likelihood of bleeding.





Cord was obtained. Umbilical cord had 3 vessels present within it. The patient 

is noted to have a small vaginal laceration which was repaired with 3-0 

Monocryl. This is done in a running fashion per routine. The epidural was used 

for anesthesia. Patient tolerated this well. Placenta delivered in a [place] 

fashion, appeared intact and complete and was discarded per patient desire.





The patient plans to breast-feed. Assessment boluses 100 mL. Condition: Good.

## 2019-04-11 NOTE — PCM.DCSUM1
**Discharge Summary





- Hospital Course


Free Text/Narrative:: 





  Rosalba is a 38-year-old  1 now para 1001 white female was admitted at 

midday on 2019 for decreased fetal movement. He is admitted for induction 

of labor. Pitocin was started initially and patient eventually had spontaneous 

rupture membranes. She progressed well through labor. She had epidural for 

labor and analgesia. She began pushing at approximately there 0015 hours on 4/10

/2019. She delivered a viable, huggins, male infant with Apgars of 8 and 8, a 

weight of 3340 g (7 pounds 5.6 ounces), a length of 22.0 inches in an occiput 

anterior position. She delivered at 0229 hours on 4/10/2019. He was placed on 

mom's abdomen, nose and mouth were bulb suctioned. Pitocin was increased to 500 

mL/h after delivery the baby to facilitate increase uterine tone and decrease 

likelihood of bleeding.





Cord was obtained. Umbilical cord had 3 vessels present within it. The patient 

is noted to have a small vaginal laceration which was repaired with 3-0 

Monocryl. This is done in a running fashion per routine. The epidural was used 

for anesthesia. Patient tolerated this well. Placenta delivered in a [place] 

fashion, appeared intact and complete and was discarded per patient desire.





The patient plans to breast-feed. Postpartum patient has done well. She had 

some borderline blood pressure evaluations with these results patient is doing 

well this point. She is nursing without problems, ambulating well and has 

minimal lochia. She is desiring discharge and baby has been released.





- Discharge Data


Discharge Date: 19


Discharge Disposition: Home, Self-Care 01


Condition: Good





- Patient Instructions


Diet: Regular Diet as Tolerated (Nursing diet with increased calories and 

calcium is recommended)


Activity: As Tolerated (No intercourse or tampons until bleeding resolves)


Driving: May Drive Today


Showering/Bathing: May Shower


Notify Provider of: Fever, Increased Pain, Swelling and Redness, Nausea and/or 

Vomiting





- Discharge Plan


Home Medications: 


 Home Meds





PNV95/Ferrous Fumarate/FA [Prenatal Tablet] 1 tab PO DAILY 19 [History]


Acetaminophen [Tylenol] 650 mg PO Q4H PRN  tablet 19 [Rx]


Ibuprofen [Motrin] 600 mg PO Q4H PRN  tablet 19 [Rx]








Referrals: 


Danish Son MD [Primary Care Provider] -  (Return [facility] 

weeks)





- Discharge Summary/Plan Comment


DC Time >30 min.: No


Discharge Summary/Plan Comment: 





Discharge instructions:





1. Discharge home





2. Diet, activity and follow-up discussed with patient. Recommend nursing diet 

with increased calories and calcium.





3. Precautions given concern increased pain, bleeding, temperature, signs/

symptoms of DVT/PE.





4. Medications per home medication was printed, discussed with and given to the 

patient.





5. Return to clinic-Dr. Son[facility] in 2 weeks.





Diagnosis: Term pregnancy-delivered 





Condition: Good





- Patient Data


Vitals - Most Recent: 


 Last Vital Signs











Temp  36.9 C   19 03:51


 


Pulse  56 L  19 03:51


 


Resp  14   19 03:51


 


BP  114/70   19 03:51


 


Pulse Ox  100   19 03:51











Weight - Most Recent: 94.347 kg


I&O - Last 24 hours: 


 Intake & Output











 04/10/19 04/10/19 04/11/19





 14:59 22:59 06:59


 


Intake Total 0  


 


Balance 0  











Med Orders - Current: 


 Current Medications





Acetaminophen (Tylenol)  650 mg PO Q4H PRN


   PRN Reason: mild pain or fever


Benzocaine/Menthol (Dermoplast Pain Relief Spray)  0 gm TOP ASDIRECTED PRN


   PRN Reason: Perineal Comfort Measure


Docusate Sodium (Colace)  100 mg PO BID PRN


   PRN Reason: Constipation


Emollient Ointment (Lansinoh Hpa)  0 gm TOP ASDIRECTED PRN


   PRN Reason: Sore Nipples


Ibuprofen (Motrin)  600 mg PO Q4H PRN


   PRN Reason: Mild pain or fever


   Last Admin: 04/10/19 20:06 Dose:  600 mg


Witch Hazel (Tucks)  1 pad TOP ASDIRECTED PRN


   PRN Reason: Pain





Discontinued Medications





Diphenhydramine HCl (Benadryl)  25 mg IVPUSH Q6H PRN


   PRN Reason: Pruritis


Ephedrine Sulfate (Ephedrine Sulfate)  5 mg IVPUSH ASDIRECTED PRN


   PRN Reason: Hypotension


Fentanyl (Sublimaze)  100 mcg EPIDUR ONETIME PRN


   PRN Reason: Pain


   Last Admin: 19 21:34 Dose:  100 mcg


Fentanyl/Bupivacaine HCl (Fentanyl-Bupiv-Ns 2 Mcg/Ml-0.125%)  100 ml EPIDUR 

ONETIME ONE


   Stop: 19 20:53


   Last Admin: 19 21:34 Dose:  100 ml


Ampicillin Sodium 2 gm/ Sodium (Chloride)  100 mls @ 200 mls/hr IV ONETIME ONE


   Stop: 19 14:29


   Last Admin: 19 14:35 Dose:  200 mls/hr


Ampicillin Sodium 1 gm/ Sodium (Chloride)  100 mls @ 200 mls/hr IV Q4H STONEY


   Last Admin: 19 21:54 Dose:  200 mls/hr


Lactated Ringer's (Ringers, Lactated)  1,000 mls @ 100 mls/hr IV ASDIRECTED STONEY


   Last Admin: 19 21:55 Dose:  100 mls/hr


Oxytocin/Lactated Ringer's (Pitocin In Lr 10 Units/1,000 Ml)  10 unit in 1,000 

mls @ 12 mls/hr IV TITRATE STONEY; Protocol


   Last Titration: 19 19:54 Dose:  14 munits/min, 84 mls/hr


Oxytocin/Lactated Ringer's (Pitocin In Lr 10 Units/1,000 Ml)  10 unit in 1,000 

mls @ 500 mls/hr IV .CONTINUOUS STONEY


Nalbuphine HCl (Nubain)  10 mg IVPUSH Q2H PRN


   PRN Reason: pain


Ondansetron HCl (Zofran)  4 mg IVPUSH Q4H PRN


   PRN Reason: Nausea/Vomiting


Ondansetron HCl (Zofran)  4 mg IVPUSH ONETIME PRN


   PRN Reason: Nausea/Vomiting


Sodium Chloride (Saline Flush)  10 ml FLUSH ASDIRECTED PRN


   PRN Reason: Keep Vein Open